# Patient Record
Sex: FEMALE | Race: WHITE | Employment: OTHER | URBAN - METROPOLITAN AREA
[De-identification: names, ages, dates, MRNs, and addresses within clinical notes are randomized per-mention and may not be internally consistent; named-entity substitution may affect disease eponyms.]

---

## 2017-07-27 ENCOUNTER — OFFICE VISIT (OUTPATIENT)
Dept: INTERNAL MEDICINE CLINIC | Age: 57
End: 2017-07-27

## 2017-07-27 VITALS
WEIGHT: 134 LBS | SYSTOLIC BLOOD PRESSURE: 112 MMHG | HEART RATE: 60 BPM | BODY MASS INDEX: 21.53 KG/M2 | HEIGHT: 66 IN | DIASTOLIC BLOOD PRESSURE: 68 MMHG

## 2017-07-27 DIAGNOSIS — D22.9 ATYPICAL MOLE: Primary | ICD-10-CM

## 2017-07-27 PROCEDURE — G8427 DOCREV CUR MEDS BY ELIG CLIN: HCPCS | Performed by: INTERNAL MEDICINE

## 2017-07-27 PROCEDURE — 3014F SCREEN MAMMO DOC REV: CPT | Performed by: INTERNAL MEDICINE

## 2017-07-27 PROCEDURE — G8420 CALC BMI NORM PARAMETERS: HCPCS | Performed by: INTERNAL MEDICINE

## 2017-07-27 PROCEDURE — 99213 OFFICE O/P EST LOW 20 MIN: CPT | Performed by: INTERNAL MEDICINE

## 2017-07-27 PROCEDURE — 3017F COLORECTAL CA SCREEN DOC REV: CPT | Performed by: INTERNAL MEDICINE

## 2017-07-27 PROCEDURE — 4004F PT TOBACCO SCREEN RCVD TLK: CPT | Performed by: INTERNAL MEDICINE

## 2017-07-27 RX ORDER — DEXTROAMPHETAMINE SACCHARATE, AMPHETAMINE ASPARTATE MONOHYDRATE, DEXTROAMPHETAMINE SULFATE AND AMPHETAMINE SULFATE 2.5; 2.5; 2.5; 2.5 MG/1; MG/1; MG/1; MG/1
10 CAPSULE, EXTENDED RELEASE ORAL EVERY MORNING
COMMUNITY
End: 2018-01-04

## 2018-01-04 ENCOUNTER — HOSPITAL ENCOUNTER (OUTPATIENT)
Dept: OTHER | Age: 58
Discharge: HOME | End: 2018-01-04
Attending: INTERNAL MEDICINE | Admitting: INTERNAL MEDICINE

## 2018-01-04 ENCOUNTER — OFFICE VISIT (OUTPATIENT)
Dept: INTERNAL MEDICINE CLINIC | Age: 58
End: 2018-01-04

## 2018-01-04 VITALS
HEIGHT: 66 IN | DIASTOLIC BLOOD PRESSURE: 62 MMHG | BODY MASS INDEX: 22.5 KG/M2 | HEART RATE: 80 BPM | SYSTOLIC BLOOD PRESSURE: 118 MMHG | TEMPERATURE: 98.9 F | WEIGHT: 140 LBS

## 2018-01-04 DIAGNOSIS — J06.9 VIRAL UPPER RESPIRATORY TRACT INFECTION: ICD-10-CM

## 2018-01-04 DIAGNOSIS — J02.9 SORE THROAT: Primary | ICD-10-CM

## 2018-01-04 LAB
RAPID INFLUENZA  B AGN: NEGATIVE
RAPID INFLUENZA A AGN: POSITIVE
S PYO AG THROAT QL: NORMAL

## 2018-01-04 PROCEDURE — 87880 STREP A ASSAY W/OPTIC: CPT | Performed by: INTERNAL MEDICINE

## 2018-01-04 PROCEDURE — G8484 FLU IMMUNIZE NO ADMIN: HCPCS | Performed by: INTERNAL MEDICINE

## 2018-01-04 PROCEDURE — 1036F TOBACCO NON-USER: CPT | Performed by: INTERNAL MEDICINE

## 2018-01-04 PROCEDURE — 99213 OFFICE O/P EST LOW 20 MIN: CPT | Performed by: INTERNAL MEDICINE

## 2018-01-04 PROCEDURE — 3014F SCREEN MAMMO DOC REV: CPT | Performed by: INTERNAL MEDICINE

## 2018-01-04 PROCEDURE — G8420 CALC BMI NORM PARAMETERS: HCPCS | Performed by: INTERNAL MEDICINE

## 2018-01-04 PROCEDURE — G8427 DOCREV CUR MEDS BY ELIG CLIN: HCPCS | Performed by: INTERNAL MEDICINE

## 2018-01-04 PROCEDURE — 3017F COLORECTAL CA SCREEN DOC REV: CPT | Performed by: INTERNAL MEDICINE

## 2018-01-04 RX ORDER — OSELTAMIVIR PHOSPHATE 75 MG/1
75 CAPSULE ORAL 2 TIMES DAILY
Qty: 10 CAPSULE | Refills: 0 | Status: SHIPPED | OUTPATIENT
Start: 2018-01-04 | End: 2018-01-09

## 2018-01-04 RX ORDER — DOXYCYCLINE HYCLATE 100 MG/1
100 CAPSULE ORAL 2 TIMES DAILY
Qty: 14 CAPSULE | Refills: 0 | Status: SHIPPED | OUTPATIENT
Start: 2018-01-04 | End: 2018-01-11

## 2018-01-04 NOTE — PROGRESS NOTES
Subjective:      Patient ID: Nikki Thacker is a 59 y.o. female. HPI-c/o fever and sore throat, wants a Strep test done  Sudden onset yest when I woke up    Review of Systems  Current Outpatient Prescriptions on File Prior to Visit   Medication Sig Dispense Refill    amphetamine-dextroamphetamine (ADDERALL XR) 10 MG extended release capsule Take 10 mg by mouth every morning .  FLUoxetine (PROZAC) 10 MG tablet Take 10 mg by mouth daily      donepezil (ARICEPT) 5 MG tablet Take 5 mg by mouth nightly      UNABLE TO FIND Take 2 tablets by mouth daily. Indications: Drenamin      Multiple Vitamins-Minerals (THERAPEUTIC MULTIVITAMIN-MINERALS) tablet Take 1 tablet by mouth daily.  ascorbic acid (VITAMIN C) 500 MG tablet Take 500 mg by mouth daily.  Coenzyme Q10 (CO Q 10) 100 MG CAPS Take 1 tablet by mouth daily. No current facility-administered medications on file prior to visit. Objective:   Physical Exam   Constitutional: She is oriented to person, place, and time. She appears well-developed and well-nourished. 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 .. HENT:   Mouth/Throat: No oropharyngeal exudate. Neck: No JVD present. No thyromegaly present. Cardiovascular: Normal rate, regular rhythm and normal heart sounds. Pulmonary/Chest: Effort normal and breath sounds normal.   Genitourinary: No vaginal discharge found. Musculoskeletal: Normal range of motion. She exhibits no edema or tenderness. Neurological: She is alert and oriented to person, place, and time. She has normal reflexes. Psychiatric: She has a normal mood and affect. Her behavior is normal.   Nursing note and vitals reviewed.     Rapid Strep screen NEGATIVE    Assessment:      URI  Patient Active Problem List   Diagnosis    IBS (irritable

## 2018-01-05 ENCOUNTER — TELEPHONE (OUTPATIENT)
Dept: INTERNAL MEDICINE CLINIC | Age: 58
End: 2018-01-05

## 2018-01-05 NOTE — TELEPHONE ENCOUNTER
----- Message from Edouard Payne MD sent at 1/5/2018  8:35 AM EST -----  Confirms flu as etiology of symptoms    Get Tamiflu as prescribed and no need to fill Doxycycline prescription    Edouard Payne

## 2018-02-28 ENCOUNTER — OFFICE VISIT (OUTPATIENT)
Dept: INTERNAL MEDICINE CLINIC | Age: 58
End: 2018-02-28

## 2018-02-28 VITALS
HEART RATE: 68 BPM | BODY MASS INDEX: 21.86 KG/M2 | TEMPERATURE: 99 F | SYSTOLIC BLOOD PRESSURE: 102 MMHG | HEIGHT: 66 IN | DIASTOLIC BLOOD PRESSURE: 58 MMHG | WEIGHT: 136 LBS

## 2018-02-28 DIAGNOSIS — J02.9 SORE THROAT: Primary | ICD-10-CM

## 2018-02-28 LAB — S PYO AG THROAT QL: NORMAL

## 2018-02-28 PROCEDURE — 87880 STREP A ASSAY W/OPTIC: CPT | Performed by: INTERNAL MEDICINE

## 2018-02-28 PROCEDURE — G8427 DOCREV CUR MEDS BY ELIG CLIN: HCPCS | Performed by: INTERNAL MEDICINE

## 2018-02-28 PROCEDURE — G8484 FLU IMMUNIZE NO ADMIN: HCPCS | Performed by: INTERNAL MEDICINE

## 2018-02-28 PROCEDURE — 3014F SCREEN MAMMO DOC REV: CPT | Performed by: INTERNAL MEDICINE

## 2018-02-28 PROCEDURE — 1036F TOBACCO NON-USER: CPT | Performed by: INTERNAL MEDICINE

## 2018-02-28 PROCEDURE — 99213 OFFICE O/P EST LOW 20 MIN: CPT | Performed by: INTERNAL MEDICINE

## 2018-02-28 PROCEDURE — 3017F COLORECTAL CA SCREEN DOC REV: CPT | Performed by: INTERNAL MEDICINE

## 2018-02-28 PROCEDURE — G8420 CALC BMI NORM PARAMETERS: HCPCS | Performed by: INTERNAL MEDICINE

## 2018-02-28 RX ORDER — OSELTAMIVIR PHOSPHATE 75 MG/1
75 CAPSULE ORAL 2 TIMES DAILY
Qty: 10 CAPSULE | Refills: 0 | Status: SHIPPED | OUTPATIENT
Start: 2018-02-28 | End: 2018-03-05

## 2018-02-28 RX ORDER — MULTIVIT-MIN/IRON/FOLIC ACID/K 18-600-40
1 CAPSULE ORAL DAILY
COMMUNITY

## 2018-03-05 ENCOUNTER — OFFICE VISIT (OUTPATIENT)
Dept: INTERNAL MEDICINE CLINIC | Age: 58
End: 2018-03-05

## 2018-03-05 VITALS
HEART RATE: 60 BPM | HEIGHT: 66 IN | BODY MASS INDEX: 21.86 KG/M2 | SYSTOLIC BLOOD PRESSURE: 102 MMHG | WEIGHT: 136 LBS | DIASTOLIC BLOOD PRESSURE: 62 MMHG

## 2018-03-05 DIAGNOSIS — J40 BRONCHITIS: ICD-10-CM

## 2018-03-05 DIAGNOSIS — B00.1 HERPES LABIALIS WITHOUT COMPLICATION: Primary | ICD-10-CM

## 2018-03-05 DIAGNOSIS — H10.32 ACUTE BACTERIAL CONJUNCTIVITIS OF LEFT EYE: ICD-10-CM

## 2018-03-05 PROCEDURE — G8427 DOCREV CUR MEDS BY ELIG CLIN: HCPCS | Performed by: INTERNAL MEDICINE

## 2018-03-05 PROCEDURE — 3017F COLORECTAL CA SCREEN DOC REV: CPT | Performed by: INTERNAL MEDICINE

## 2018-03-05 PROCEDURE — G8484 FLU IMMUNIZE NO ADMIN: HCPCS | Performed by: INTERNAL MEDICINE

## 2018-03-05 PROCEDURE — G8420 CALC BMI NORM PARAMETERS: HCPCS | Performed by: INTERNAL MEDICINE

## 2018-03-05 PROCEDURE — 1036F TOBACCO NON-USER: CPT | Performed by: INTERNAL MEDICINE

## 2018-03-05 PROCEDURE — 99213 OFFICE O/P EST LOW 20 MIN: CPT | Performed by: INTERNAL MEDICINE

## 2018-03-05 PROCEDURE — 3014F SCREEN MAMMO DOC REV: CPT | Performed by: INTERNAL MEDICINE

## 2018-03-05 RX ORDER — ACYCLOVIR 50 MG/G
OINTMENT TOPICAL
Qty: 1 TUBE | Refills: 1 | Status: CANCELLED | OUTPATIENT
Start: 2018-03-05 | End: 2018-03-12

## 2018-03-05 RX ORDER — SULFAMETHOXAZOLE AND TRIMETHOPRIM 800; 160 MG/1; MG/1
1 TABLET ORAL 2 TIMES DAILY
Qty: 20 TABLET | Refills: 0 | Status: SHIPPED | OUTPATIENT
Start: 2018-03-05 | End: 2018-03-15

## 2018-03-05 RX ORDER — SULFACETAMIDE SODIUM 100 MG/ML
2 SOLUTION/ DROPS OPHTHALMIC 4 TIMES DAILY
Qty: 1 BOTTLE | Refills: 0 | Status: SHIPPED | OUTPATIENT
Start: 2018-03-05 | End: 2018-03-15

## 2018-07-16 ENCOUNTER — TELEPHONE (OUTPATIENT)
Dept: INTERNAL MEDICINE CLINIC | Age: 58
End: 2018-07-16

## 2018-07-16 DIAGNOSIS — F22 PARANOID DELUSION (HCC): Primary | ICD-10-CM

## 2018-07-16 DIAGNOSIS — F09 CHRONIC BRAIN SYNDROME: ICD-10-CM

## 2018-07-16 NOTE — TELEPHONE ENCOUNTER
Pt coming in on Mon, July 23 for CPX, would like lab work orders. Please enter into [employer] so she can go to hospital to draw blood for tests.     ANGELICA

## 2018-07-28 LAB
A/G RATIO: 1.8 (CALC) (ref 1–2.5)
ALBUMIN SERPL-MCNC: 4.1 G/DL (ref 3.6–5.1)
ALP BLD-CCNC: 61 U/L (ref 33–130)
ALT SERPL-CCNC: 16 U/L (ref 6–29)
AST SERPL-CCNC: 19 U/L (ref 10–35)
ATYPICAL LYMPHOCYTE RELATIVE PERCENT: ABNORMAL % (ref 0–10)
BAND NEUTROPHILS: ABNORMAL %
BANDED NEUTROPHILS ABSOLUTE COUNT: ABNORMAL CELLS/UL (ref 0–750)
BASOPHILS ABSOLUTE: 20 CELLS/UL (ref 0–200)
BASOPHILS RELATIVE PERCENT: 0.6 %
BILIRUB SERPL-MCNC: 0.7 MG/DL (ref 0.2–1.2)
BLASTS ABSOLUTE COUNT: ABNORMAL CELLS/UL
BLASTS RELATIVE PERCENT: ABNORMAL %
BUN / CREAT RATIO: NORMAL (CALC) (ref 6–22)
BUN BLDV-MCNC: 10 MG/DL (ref 7–25)
CALCIUM SERPL-MCNC: 9.1 MG/DL (ref 8.6–10.4)
CHLORIDE BLD-SCNC: 102 MMOL/L (ref 98–110)
CHOLESTEROL, TOTAL: 180 MG/DL
CHOLESTEROL/HDL RATIO: 2.8 (CALC)
CHOLESTEROL: 116 MG/DL (CALC)
CO2: 28 MMOL/L (ref 20–31)
COMMENT: ABNORMAL
CREAT SERPL-MCNC: 0.67 MG/DL (ref 0.5–1.05)
EOSINOPHILS ABSOLUTE: 102 CELLS/UL (ref 15–500)
EOSINOPHILS RELATIVE PERCENT: 3 %
GFR AFRICAN AMERICAN: 112 ML/MIN/1.73M2
GFR SERPL CREATININE-BSD FRML MDRD: 96 ML/MIN/1.73M2
GLOBULIN: 2.3 G/DL (CALC) (ref 1.9–3.7)
GLUCOSE BLD-MCNC: 80 MG/DL (ref 65–99)
HCT VFR BLD CALC: 41 % (ref 35–45)
HDLC SERPL-MCNC: 64 MG/DL
HEMOGLOBIN: 13.7 G/DL (ref 11.7–15.5)
LDL CHOLESTEROL CALCULATED: 100 MG/DL (CALC)
LYMPHOCYTES ABSOLUTE: 1384 CELLS/UL (ref 850–3900)
LYMPHOCYTES RELATIVE PERCENT: 40.7 %
MCH RBC QN AUTO: 29.8 PG (ref 27–33)
MCHC RBC AUTO-ENTMCNC: 33.4 G/DL (ref 32–36)
MCV RBC AUTO: 89.1 FL (ref 80–100)
METAMYELOCYTES ABSOLUTE COUNT: ABNORMAL CELLS/UL
METAMYELOCYTES RELATIVE PERCENT: ABNORMAL %
MONOCYTES ABSOLUTE: 394 CELLS/UL (ref 200–950)
MONOCYTES RELATIVE PERCENT: 11.6 %
MYELOCYTE PERCENT: ABNORMAL %
MYELOCYTES ABSOLUTE COUNT: ABNORMAL CELLS/UL
NEUTROPHILS ABSOLUTE: 1499 CELLS/UL (ref 1500–7800)
NUCLEATED RED BLOOD CELLS: ABNORMAL /100 WBC
NUCLEATED RED BLOOD CELLS: ABNORMAL CELLS/UL
PDW BLD-RTO: 12.7 % (ref 11–15)
PLATELET # BLD: 232 THOUSAND/UL (ref 140–400)
PMV BLD AUTO: 9.6 FL (ref 7.5–12.5)
POTASSIUM SERPL-SCNC: 4.4 MMOL/L (ref 3.5–5.3)
PROMYELOCYTES ABSOLUTE COUNT: ABNORMAL CELLS/UL
PROMYELOCYTES PERCENT: ABNORMAL %
RBC # BLD: 4.6 MILLION/UL (ref 3.8–5.1)
SEGMENTED NEUTROPHILS RELATIVE PERCENT: 44.1 %
SODIUM BLD-SCNC: 137 MMOL/L (ref 135–146)
TOTAL PROTEIN: 6.4 G/DL (ref 6.1–8.1)
TRIGL SERPL-MCNC: 75 MG/DL
WBC # BLD: 3.4 THOUSAND/UL (ref 3.8–10.8)

## 2018-08-06 ENCOUNTER — TELEPHONE (OUTPATIENT)
Dept: INTERNAL MEDICINE CLINIC | Age: 58
End: 2018-08-06

## 2018-08-20 ENCOUNTER — OFFICE VISIT (OUTPATIENT)
Dept: INTERNAL MEDICINE CLINIC | Age: 58
End: 2018-08-20

## 2018-08-20 VITALS
HEIGHT: 66 IN | HEART RATE: 60 BPM | DIASTOLIC BLOOD PRESSURE: 64 MMHG | WEIGHT: 138 LBS | BODY MASS INDEX: 22.18 KG/M2 | SYSTOLIC BLOOD PRESSURE: 110 MMHG

## 2018-08-20 DIAGNOSIS — Z12.39 BREAST CANCER SCREENING: ICD-10-CM

## 2018-08-20 DIAGNOSIS — Z00.00 ANNUAL PHYSICAL EXAM: Primary | ICD-10-CM

## 2018-08-20 DIAGNOSIS — Z12.11 COLON CANCER SCREENING: ICD-10-CM

## 2018-08-20 DIAGNOSIS — Z01.419 ENCOUNTER FOR GYNECOLOGICAL EXAMINATION WITHOUT ABNORMAL FINDING: ICD-10-CM

## 2018-08-20 LAB
BILIRUBIN, POC: NORMAL
BLOOD URINE, POC: NORMAL
CLARITY, POC: CLEAR
COLOR, POC: NORMAL
GLUCOSE URINE, POC: NORMAL
KETONES, POC: NORMAL
LEUKOCYTE EST, POC: NORMAL
NITRITE, POC: NORMAL
PH, POC: 5
PROTEIN, POC: NORMAL
SPECIFIC GRAVITY, POC: 1.01
UROBILINOGEN, POC: 1

## 2018-08-20 PROCEDURE — 3017F COLORECTAL CA SCREEN DOC REV: CPT | Performed by: INTERNAL MEDICINE

## 2018-08-20 PROCEDURE — G8420 CALC BMI NORM PARAMETERS: HCPCS | Performed by: INTERNAL MEDICINE

## 2018-08-20 PROCEDURE — 99396 PREV VISIT EST AGE 40-64: CPT | Performed by: INTERNAL MEDICINE

## 2018-08-20 PROCEDURE — 1036F TOBACCO NON-USER: CPT | Performed by: INTERNAL MEDICINE

## 2018-08-20 PROCEDURE — G8427 DOCREV CUR MEDS BY ELIG CLIN: HCPCS | Performed by: INTERNAL MEDICINE

## 2018-08-20 PROCEDURE — 93000 ELECTROCARDIOGRAM COMPLETE: CPT | Performed by: INTERNAL MEDICINE

## 2018-08-20 PROCEDURE — 81002 URINALYSIS NONAUTO W/O SCOPE: CPT | Performed by: INTERNAL MEDICINE

## 2018-08-20 RX ORDER — MAGNESIUM OXIDE 400 MG/1
200 TABLET ORAL DAILY
COMMUNITY

## 2018-08-20 NOTE — PROGRESS NOTES
07/27/2018     Lab Results   Component Value Date     07/27/2018    K 4.4 07/27/2018     07/27/2018    CO2 28 07/27/2018    BUN 10 07/27/2018    CREATININE 0.67 07/27/2018    GLUCOSE 80 07/27/2018    CALCIUM 9.1 07/27/2018    PROT 6.4 07/27/2018    LABALBU 4.1 07/27/2018    BILITOT 0.7 07/27/2018    ALKPHOS 61 07/27/2018    AST 19 07/27/2018    ALT 16 07/27/2018    LABGLOM 96 07/27/2018    GFRAA 112 07/27/2018    AGRATIO 1.8 07/27/2018    GLOB 2.3 07/27/2018       Lab Results   Component Value Date    CHOL 180 07/27/2018    CHOL 116 07/27/2018    CHOL 191 11/08/2014     Lab Results   Component Value Date    TRIG 75 07/27/2018    TRIG 67 11/08/2014     Lab Results   Component Value Date    HDL 64 07/27/2018    HDL 55 11/08/2014     Lab Results   Component Value Date    LDLCALC 100 (H) 07/27/2018    LDLCALC 123 11/08/2014     No results found for: LABVLDL, VLDL  Lab Results   Component Value Date    CHOLHDLRATIO 2.8 07/27/2018    CHOLHDLRATIO 3.5 11/08/2014       Assessment:      Patient Active Problem List   Diagnosis    IBS (irritable bowel syndrome)    Depression    Anxiety    Colon cancer screening    Chronic brain syndrome    Breast cancer screening    Paranoid delusion ([facility] [address]           Plan:      Advised pt to continue f/u with her psychiatrist regarding her multiple issues    Reassured pt that medically she is stable  Screening mammogram ordered    Advised f/u with GYN for PAP-referred    GI referral for another screening colonoscopy since no documentation of original exam has been identified  Used to see Gris Harris    No Echo needed due to past MVP, was told she was fine for scuba diving    Surendra Galeano     HM: annual fluvax is advised  Consider Maria R Galeano MD

## 2018-12-10 ENCOUNTER — HOSPITAL ENCOUNTER (OUTPATIENT)
Age: 58
Discharge: HOME | End: 2018-12-10
Payer: COMMERCIAL

## 2018-12-10 ENCOUNTER — OFFICE VISIT (OUTPATIENT)
Dept: INTERNAL MEDICINE CLINIC | Age: 58
End: 2018-12-10

## 2018-12-10 VITALS
HEIGHT: 66 IN | SYSTOLIC BLOOD PRESSURE: 110 MMHG | DIASTOLIC BLOOD PRESSURE: 62 MMHG | HEART RATE: 68 BPM | BODY MASS INDEX: 22.82 KG/M2 | WEIGHT: 142 LBS | TEMPERATURE: 98.6 F

## 2018-12-10 DIAGNOSIS — J02.9 SORE THROAT: Primary | ICD-10-CM

## 2018-12-10 LAB
RAPID INFLUENZA  B AGN: NEGATIVE
RAPID INFLUENZA A AGN: NEGATIVE
S PYO AG THROAT QL: NORMAL

## 2018-12-10 PROCEDURE — G8427 DOCREV CUR MEDS BY ELIG CLIN: HCPCS | Performed by: INTERNAL MEDICINE

## 2018-12-10 PROCEDURE — G8420 CALC BMI NORM PARAMETERS: HCPCS | Performed by: INTERNAL MEDICINE

## 2018-12-10 PROCEDURE — 3017F COLORECTAL CA SCREEN DOC REV: CPT | Performed by: INTERNAL MEDICINE

## 2018-12-10 PROCEDURE — 99213 OFFICE O/P EST LOW 20 MIN: CPT | Performed by: INTERNAL MEDICINE

## 2018-12-10 PROCEDURE — 87880 STREP A ASSAY W/OPTIC: CPT | Performed by: INTERNAL MEDICINE

## 2018-12-10 PROCEDURE — 87804 INFLUENZA ASSAY W/OPTIC: CPT

## 2018-12-10 PROCEDURE — G8484 FLU IMMUNIZE NO ADMIN: HCPCS | Performed by: INTERNAL MEDICINE

## 2018-12-10 PROCEDURE — 1036F TOBACCO NON-USER: CPT | Performed by: INTERNAL MEDICINE

## 2018-12-10 RX ORDER — VITAMIN E 268 MG
400 CAPSULE ORAL DAILY
COMMUNITY

## 2018-12-10 RX ORDER — FLUOXETINE HYDROCHLORIDE 20 MG/1
40 CAPSULE ORAL DAILY
COMMUNITY
End: 2022-07-05

## 2018-12-11 ENCOUNTER — TELEPHONE (OUTPATIENT)
Dept: INTERNAL MEDICINE CLINIC | Age: 58
End: 2018-12-11

## 2019-10-02 ENCOUNTER — TELEPHONE (OUTPATIENT)
Dept: PRIMARY CARE CLINIC | Age: 59
End: 2019-10-02

## 2019-10-02 DIAGNOSIS — Z00.00 WELL ADULT HEALTH CHECK: Primary | ICD-10-CM

## 2022-06-16 ENCOUNTER — TELEPHONE (OUTPATIENT)
Dept: PRIMARY CARE CLINIC | Age: 62
End: 2022-06-16

## 2022-07-05 ENCOUNTER — OFFICE VISIT (OUTPATIENT)
Dept: PRIMARY CARE CLINIC | Age: 62
End: 2022-07-05
Payer: COMMERCIAL

## 2022-07-05 VITALS
WEIGHT: 131.4 LBS | OXYGEN SATURATION: 98 % | DIASTOLIC BLOOD PRESSURE: 64 MMHG | SYSTOLIC BLOOD PRESSURE: 106 MMHG | HEIGHT: 66 IN | BODY MASS INDEX: 21.12 KG/M2 | HEART RATE: 67 BPM

## 2022-07-05 DIAGNOSIS — Z82.62 FAM HX-OSTEOPOROSIS: ICD-10-CM

## 2022-07-05 DIAGNOSIS — M72.2 PLANTAR FASCIAL FIBROMATOSIS OF LEFT FOOT: ICD-10-CM

## 2022-07-05 DIAGNOSIS — F41.9 ANXIETY: ICD-10-CM

## 2022-07-05 DIAGNOSIS — Z76.89 ENCOUNTER TO ESTABLISH CARE WITH NEW DOCTOR: Primary | ICD-10-CM

## 2022-07-05 DIAGNOSIS — F32.1 CURRENT MODERATE EPISODE OF MAJOR DEPRESSIVE DISORDER, UNSPECIFIED WHETHER RECURRENT (HCC): ICD-10-CM

## 2022-07-05 DIAGNOSIS — Z00.00 WELL ADULT EXAM: ICD-10-CM

## 2022-07-05 DIAGNOSIS — E83.42 HYPOMAGNESEMIA: ICD-10-CM

## 2022-07-05 DIAGNOSIS — Z01.419 ENCOUNTER FOR GYNECOLOGICAL EXAMINATION WITHOUT ABNORMAL FINDING: ICD-10-CM

## 2022-07-05 DIAGNOSIS — Z12.11 COLON CANCER SCREENING: ICD-10-CM

## 2022-07-05 DIAGNOSIS — Z11.59 SCREENING FOR VIRAL DISEASE: ICD-10-CM

## 2022-07-05 PROCEDURE — 99205 OFFICE O/P NEW HI 60 MIN: CPT | Performed by: INTERNAL MEDICINE

## 2022-07-05 PROCEDURE — 3017F COLORECTAL CA SCREEN DOC REV: CPT | Performed by: INTERNAL MEDICINE

## 2022-07-05 PROCEDURE — G8420 CALC BMI NORM PARAMETERS: HCPCS | Performed by: INTERNAL MEDICINE

## 2022-07-05 PROCEDURE — G8427 DOCREV CUR MEDS BY ELIG CLIN: HCPCS | Performed by: INTERNAL MEDICINE

## 2022-07-05 PROCEDURE — 1036F TOBACCO NON-USER: CPT | Performed by: INTERNAL MEDICINE

## 2022-07-05 RX ORDER — PNV NO.95/FERROUS FUM/FOLIC AC 28MG-0.8MG
2 TABLET ORAL DAILY
COMMUNITY

## 2022-07-05 ASSESSMENT — PATIENT HEALTH QUESTIONNAIRE - PHQ9
6. FEELING BAD ABOUT YOURSELF - OR THAT YOU ARE A FAILURE OR HAVE LET YOURSELF OR YOUR FAMILY DOWN: 0
SUM OF ALL RESPONSES TO PHQ QUESTIONS 1-9: 0
3. TROUBLE FALLING OR STAYING ASLEEP: 0
8. MOVING OR SPEAKING SO SLOWLY THAT OTHER PEOPLE COULD HAVE NOTICED. OR THE OPPOSITE, BEING SO FIGETY OR RESTLESS THAT YOU HAVE BEEN MOVING AROUND A LOT MORE THAN USUAL: 0
1. LITTLE INTEREST OR PLEASURE IN DOING THINGS: 0
SUM OF ALL RESPONSES TO PHQ QUESTIONS 1-9: 0
7. TROUBLE CONCENTRATING ON THINGS, SUCH AS READING THE NEWSPAPER OR WATCHING TELEVISION: 0
9. THOUGHTS THAT YOU WOULD BE BETTER OFF DEAD, OR OF HURTING YOURSELF: 0
2. FEELING DOWN, DEPRESSED OR HOPELESS: 0
SUM OF ALL RESPONSES TO PHQ QUESTIONS 1-9: 0
4. FEELING TIRED OR HAVING LITTLE ENERGY: 0
5. POOR APPETITE OR OVEREATING: 0
SUM OF ALL RESPONSES TO PHQ QUESTIONS 1-9: 0
10. IF YOU CHECKED OFF ANY PROBLEMS, HOW DIFFICULT HAVE THESE PROBLEMS MADE IT FOR YOU TO DO YOUR WORK, TAKE CARE OF THINGS AT HOME, OR GET ALONG WITH OTHER PEOPLE: 0
SUM OF ALL RESPONSES TO PHQ9 QUESTIONS 1 & 2: 0

## 2022-07-05 NOTE — PATIENT INSTRUCTIONS
Consider Shingrix vaccination series of 2 shots over 2-6 months if desired for protection from shingles-check with INS first re: coverage before beginning series    Schedule screening colonoscopy for colon CA screening    Check fasting labs    F/u with GYN for PAP smears as indicated

## 2022-07-05 NOTE — PROGRESS NOTES
SUBJECTIVE:  7/5/2022   Katie Barba  1960    Past Medical History:   Diagnosis Date    Anxiety     Depression      No past surgical history on file. Family History   Problem Relation Age of Onset    Hearing Loss Mother     High Blood Pressure Mother     High Cholesterol Mother     Arthritis Father     Depression Father     High Cholesterol Father       Social History     Socioeconomic History    Marital status: Single     Spouse name: Not on file    Number of children: 0    Years of education: Not on file    Highest education level: Not on file   Occupational History    Occupation: teacher     Comment: refuses to tell   Tobacco Use    Smoking status: Never Smoker    Smokeless tobacco: Never Used    Tobacco comment: never   Substance and Sexual Activity    Alcohol use: No     Comment: none    Drug use: No    Sexual activity: Not Currently     Partners: Male   Other Topics Concern    Not on file   Social History Narrative    Not on file     Social Determinants of Health     Financial Resource Strain:     Difficulty of Paying Living Expenses: Not on file   Food Insecurity:     Worried About Running Out of Food in the Last Year: Not on file    Doroteo of Food in the Last Year: Not on file   Transportation Needs:     Lack of Transportation (Medical): Not on file    Lack of Transportation (Non-Medical):  Not on file   Physical Activity:     Days of Exercise per Week: Not on file    Minutes of Exercise per Session: Not on file   Stress:     Feeling of Stress : Not on file   Social Connections:     Frequency of Communication with Friends and Family: Not on file    Frequency of Social Gatherings with Friends and Family: Not on file    Attends Anabaptism Services: Not on file    Active Member of Clubs or Organizations: Not on file    Attends Club or Organization Meetings: Not on file    Marital Status: Not on file   Intimate Partner Violence:     Fear of Current or Ex-Partner: Not on file    Emotionally Abused: Not on file    Physically Abused: Not on file    Sexually Abused: Not on file   Housing Stability:     Unable to Pay for Housing in the Last Year: Not on file    Number of Places Lived in the Last Year: Not on file    Unstable Housing in the Last Year: Not on file      Allergies   Allergen Reactions    Amoxicillin Rash      Current Outpatient Medications   Medication Sig Dispense Refill    Omega-3 Fatty Acids (FISH OIL OMEGA-3) 1000 MG CAPS Take 2 g by mouth daily      vitamin E 400 UNIT capsule Take 400 Units by mouth daily      aspirin 81 MG tablet Take 81 mg by mouth daily      magnesium oxide (MAG-OX) 400 MG tablet Take 200 mg by mouth daily      Cholecalciferol (VITAMIN D) 2000 units CAPS capsule Take 1 capsule by mouth daily      Multiple Vitamins-Minerals (THERAPEUTIC MULTIVITAMIN-MINERALS) tablet Take 1 tablet by mouth daily.  ascorbic acid (VITAMIN C) 500 MG tablet Take 500 mg by mouth daily.  Coenzyme Q10 (CO Q 10) 100 MG CAPS Take 1 tablet by mouth daily. No current facility-administered medications for this visit. Chief Complaint   Patient presents with    New Patient       HPI: wants to re-establish care with this provider, hasn't been seen in 4 years  Needs labs done.  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 Constitutional:       General: She is not in acute distress. Appearance: Normal appearance. She is well-developed. Comments: Anxious and suspicious demeanor   HENT:      Head: Normocephalic and atraumatic. Right Ear: Tympanic membrane, ear canal and external ear normal.      Left Ear: Tympanic membrane, ear canal and external ear normal.      Nose: Nose normal. No rhinorrhea. Mouth/Throat:      Pharynx: No oropharyngeal exudate or posterior oropharyngeal erythema. Eyes:      General:         Right eye: No discharge. Left eye: No discharge. Extraocular Movements: Extraocular movements intact. Conjunctiva/sclera: Conjunctivae normal.      Pupils: Pupils are equal, round, and reactive to light. Neck:      Thyroid: No thyromegaly. Vascular: No carotid bruit or JVD. Cardiovascular:      Rate and Rhythm: Normal rate and regular rhythm. Pulses: Normal pulses. Heart sounds: Normal heart sounds. No murmur heard. Pulmonary:      Effort: Pulmonary effort is normal. No respiratory distress. Breath sounds: Normal breath sounds. No wheezing, rhonchi or rales. Abdominal:      General: Bowel sounds are normal. There is no distension. Palpations: Abdomen is soft. Tenderness: There is no abdominal tenderness. There is no rebound. Musculoskeletal:         General: No swelling. Cervical back: Normal range of motion. No muscular tenderness. Right lower leg: No edema. Left lower leg: No edema. Comments: FROM x 4   Lymphadenopathy:      Cervical: No cervical adenopathy. Skin:     General: Skin is warm and dry. Capillary Refill: Capillary refill takes 2 to 3 seconds. Coloration: Skin is not pale. Findings: No erythema or rash.    Neurological: Mental Status: She is alert and oriented to person, place, and time. Cranial Nerves: No cranial nerve deficit. Sensory: No sensory deficit. Motor: No abnormal muscle tone. Deep Tendon Reflexes: Reflexes normal.   Psychiatric:         Mood and Affect: Mood normal.         Behavior: Behavior normal.         Thought Content: Thought content normal.         Judgment: Judgment normal.         Data Review:  Lab Results   Component Value Date    CHOL 180 07/27/2018    CHOL 116 07/27/2018    CHOL 191 11/08/2014     Lab Results   Component Value Date    TRIG 75 07/27/2018    TRIG 67 11/08/2014     Lab Results   Component Value Date    HDL 64 07/27/2018    HDL 55 11/08/2014     Lab Results   Component Value Date    LDLCALC 100 (H) 07/27/2018    LDLCALC 123 11/08/2014     No results found for: LABVLDL, VLDL  Lab Results   Component Value Date    CHOLHDLRATIO 2.8 07/27/2018    CHOLHDLRATIO 3.5 11/08/2014      Lab Results   Component Value Date    WBC 3.4 (L) 07/27/2018    HGB 13.7 07/27/2018    HCT 41.0 07/27/2018    MCV 89.1 07/27/2018     07/27/2018     Lab Results   Component Value Date     07/27/2018    K 4.4 07/27/2018     07/27/2018    CO2 28 07/27/2018    BUN 10 07/27/2018    CREATININE 0.67 07/27/2018    GLUCOSE 80 07/27/2018    CALCIUM 9.1 07/27/2018    PROT 6.4 07/27/2018    LABALBU 4.1 07/27/2018    BILITOT 0.7 07/27/2018    ALKPHOS 61 07/27/2018    AST 19 07/27/2018    ALT 16 07/27/2018    LABGLOM 96 07/27/2018    GFRAA 112 07/27/2018    AGRATIO 1.8 07/27/2018    GLOB 2.3 07/27/2018       ASSESSMENT/PLAN  1. Anxiety  Psych referral already sees Karis    2.  Current moderate episode of major depressive disorder, unspecified whether recurrent ([facility] [address]  Psych referral  Depression screen today    Consider Shingrix vaccination series of 2 shots over 2-6 months if desired for protection from shingles-check with INS first re: coverage before beginning series  Schedule colonoscopy with GI for colon CA screening    Fasting labs ordered     F/u with GYN for PAP smears as indicated    wants DXA next year  Wants vit D level  belches a lot, no reflux or anything, mom does the same thing, will discuss with GI re: possible EGD    Want her foot checked-she will decide if she wants foot x-ray or not     f/u in 6 months if labs Lynda Ivey MD      Electronically signed by Dyan Medina MD on 7/5/2022 at 10:13 AM

## 2022-07-05 NOTE — PROGRESS NOTES
Pt here to re-est care with Dr Damon Landry. Not seen since 12/2018    Stevie Pinon no issues or concerns today    Pt needs to complete the following. ..   Colorectal Cancer Screen  Cervical Scan/PAP  Shingrix

## 2022-07-19 ENCOUNTER — TELEPHONE (OUTPATIENT)
Dept: PRIMARY CARE CLINIC | Age: 62
End: 2022-07-19

## 2022-07-19 NOTE — TELEPHONE ENCOUNTER
Pt is calling stating she was last in July 5 is calling wanting to ask some questions pt is wanting to know why Jerzy Whitley ordered a test for  Magnesium? Pt states the diginose for major Depressive Disorder is not true it is incorrect is asking if that can be changed pt wants to know why she has to have a follow up in 6 months?  Pt is requesting MA to call her back

## 2022-07-20 NOTE — TELEPHONE ENCOUNTER
Informed pt that the Dx codes have been changed to match the visit.  Also mentioned with family Hx of osteoporosis Msg levels should be checked

## 2023-02-28 ENCOUNTER — TELEPHONE (OUTPATIENT)
Dept: PRIMARY CARE CLINIC | Age: 63
End: 2023-02-28

## 2023-03-02 ENCOUNTER — OFFICE VISIT (OUTPATIENT)
Dept: PRIMARY CARE CLINIC | Age: 63
End: 2023-03-02
Payer: COMMERCIAL

## 2023-03-02 VITALS
DIASTOLIC BLOOD PRESSURE: 68 MMHG | SYSTOLIC BLOOD PRESSURE: 122 MMHG | OXYGEN SATURATION: 98 % | HEART RATE: 91 BPM | WEIGHT: 136 LBS | BODY MASS INDEX: 22.29 KG/M2 | TEMPERATURE: 98.2 F

## 2023-03-02 DIAGNOSIS — Z12.11 COLON CANCER SCREENING: Primary | ICD-10-CM

## 2023-03-02 DIAGNOSIS — Z82.62 FHX: OSTEOPOROSIS: ICD-10-CM

## 2023-03-02 DIAGNOSIS — R53.83 OTHER FATIGUE: ICD-10-CM

## 2023-03-02 PROCEDURE — 3017F COLORECTAL CA SCREEN DOC REV: CPT | Performed by: INTERNAL MEDICINE

## 2023-03-02 PROCEDURE — G8420 CALC BMI NORM PARAMETERS: HCPCS | Performed by: INTERNAL MEDICINE

## 2023-03-02 PROCEDURE — 99213 OFFICE O/P EST LOW 20 MIN: CPT | Performed by: INTERNAL MEDICINE

## 2023-03-02 PROCEDURE — G8427 DOCREV CUR MEDS BY ELIG CLIN: HCPCS | Performed by: INTERNAL MEDICINE

## 2023-03-02 PROCEDURE — G8482 FLU IMMUNIZE ORDER/ADMIN: HCPCS | Performed by: INTERNAL MEDICINE

## 2023-03-02 PROCEDURE — 1036F TOBACCO NON-USER: CPT | Performed by: INTERNAL MEDICINE

## 2023-03-02 ASSESSMENT — PATIENT HEALTH QUESTIONNAIRE - PHQ9
1. LITTLE INTEREST OR PLEASURE IN DOING THINGS: 0
SUM OF ALL RESPONSES TO PHQ QUESTIONS 1-9: 5
2. FEELING DOWN, DEPRESSED OR HOPELESS: 0
8. MOVING OR SPEAKING SO SLOWLY THAT OTHER PEOPLE COULD HAVE NOTICED. OR THE OPPOSITE, BEING SO FIGETY OR RESTLESS THAT YOU HAVE BEEN MOVING AROUND A LOT MORE THAN USUAL: 0
SUM OF ALL RESPONSES TO PHQ QUESTIONS 1-9: 5
SUM OF ALL RESPONSES TO PHQ QUESTIONS 1-9: 5
7. TROUBLE CONCENTRATING ON THINGS, SUCH AS READING THE NEWSPAPER OR WATCHING TELEVISION: 0
3. TROUBLE FALLING OR STAYING ASLEEP: 3
5. POOR APPETITE OR OVEREATING: 0
9. THOUGHTS THAT YOU WOULD BE BETTER OFF DEAD, OR OF HURTING YOURSELF: 0
4. FEELING TIRED OR HAVING LITTLE ENERGY: 2
SUM OF ALL RESPONSES TO PHQ9 QUESTIONS 1 & 2: 0
6. FEELING BAD ABOUT YOURSELF - OR THAT YOU ARE A FAILURE OR HAVE LET YOURSELF OR YOUR FAMILY DOWN: 0
SUM OF ALL RESPONSES TO PHQ QUESTIONS 1-9: 5

## 2023-03-02 NOTE — PATIENT INSTRUCTIONS
F/u with GYN for PAP smears as indicated    Consider getting colonoscopy for colon CA screening    Consider Shingrix vaccination series of 2 shots over 2-6 months if desired for protection from shingles-check with INS first re: coverage before beginning series

## 2023-03-02 NOTE — PROGRESS NOTES
3/2/2023   Katie Barba  1960    The patients PMH, surgical history, family history, medications, allergies were all reviewed and updated as appropriate today. Current Outpatient Medications on File Prior to Visit   Medication Sig Dispense Refill    Omega-3 Fatty Acids (FISH OIL OMEGA-3) 1000 MG CAPS Take 2 g by mouth daily      vitamin E 400 UNIT capsule Take 400 Units by mouth daily      aspirin 81 MG tablet Take 81 mg by mouth daily      magnesium oxide (MAG-OX) 400 MG tablet Take 200 mg by mouth daily      Cholecalciferol (VITAMIN D) 2000 units CAPS capsule Take 1 capsule by mouth daily      Multiple Vitamins-Minerals (THERAPEUTIC MULTIVITAMIN-MINERALS) tablet Take 1 tablet by mouth daily. ascorbic acid (VITAMIN C) 500 MG tablet Take 500 mg by mouth daily. Coenzyme Q10 (CO Q 10) 100 MG CAPS Take 1 tablet by mouth daily. No current facility-administered medications on file prior to visit. Chief Complaint   Patient presents with    6 Month Follow-Up     Pt here for 6 month follow up  No Complaints     Discuss Labs         HPI: Patient here today to review her most recent labs. She did have low magnesium levels in the past and therefore takes 1 magnesium oxide tablet daily, repeat magnesium level this time was normal at 2.1. Her cholesterol values actually look fine with a total cholesterol of 179, vitamin D level was normal blood sugar was normal at 90   Wants a DXA scan due to ostopeorosis in mother    Review of Systems    OBJECTIVE:  /68   Pulse 91   Temp 98.2 °F (36.8 °C) (Infrared)   Wt 136 lb (61.7 kg)   SpO2 98%   BMI 22.29 kg/m²       Wt Readings from Last 3 Encounters:   07/05/22 131 lb 6.4 oz (59.6 kg)   12/10/18 142 lb (64.4 kg)   08/20/18 138 lb (62.6 kg)       Physical Exam  Vitals and nursing note reviewed. Constitutional:       Appearance: Normal appearance. She is well-developed and normal weight.       Comments:      Neck:      Thyroid: No thyromegaly. Vascular: No JVD. Cardiovascular:      Rate and Rhythm: Normal rate and regular rhythm. Heart sounds: Normal heart sounds. Pulmonary:      Effort: Pulmonary effort is normal.      Breath sounds: Normal breath sounds. Abdominal:      General: Abdomen is flat. Musculoskeletal:         General: No tenderness. Normal range of motion. Neurological:      Mental Status: She is alert and oriented to person, place, and time. Deep Tendon Reflexes: Reflexes are normal and symmetric.    Psychiatric:         Behavior: Behavior normal.       Data Review:   CBC:   Lab Results   Component Value Date/Time    WBC 3.4 07/27/2018 11:48 AM    WBC 5.7 08/15/2014 06:24 AM    WBC 4.0 03/29/2013 10:39 AM    HGB 13.7 07/27/2018 11:48 AM    HGB 14.2 08/15/2014 06:24 AM    HGB 14.2 03/29/2013 10:39 AM    HCT 41.0 07/27/2018 11:48 AM    HCT 41.8 08/15/2014 06:24 AM    HCT 42.0 03/29/2013 10:39 AM    MCV 89.1 07/27/2018 11:48 AM    MCV 91.4 08/15/2014 06:24 AM    MCV 91.7 03/29/2013 10:39 AM     07/27/2018 11:48 AM     08/15/2014 06:24 AM     03/29/2013 10:39 AM     Chemistry:   Lab Results   Component Value Date/Time     07/27/2018 11:48 AM     08/15/2014 06:24 AM     03/29/2013 10:39 AM    K 4.4 07/27/2018 11:48 AM    K 3.8 08/15/2014 06:24 AM    K 4.0 03/29/2013 10:39 AM     07/27/2018 11:48 AM     08/15/2014 06:24 AM     03/29/2013 10:39 AM    CO2 28 07/27/2018 11:48 AM    CO2 27 08/15/2014 06:24 AM    CO2 27 03/29/2013 10:39 AM    BUN 10 07/27/2018 11:48 AM    BUN 8 08/15/2014 06:24 AM    BUN 12 03/29/2013 10:39 AM    CREATININE 0.67 07/27/2018 11:48 AM    CREATININE 0.5 08/15/2014 06:24 AM    CREATININE 0.73 03/29/2013 10:39 AM     Hepatic Function:   Lab Results   Component Value Date/Time    AST 19 07/27/2018 11:48 AM    AST 21 08/15/2014 06:24 AM    AST 25 03/29/2013 10:39 AM    ALT 16 07/27/2018 11:48 AM    ALT 17 08/15/2014 06:24 AM    ALT 17 03/29/2013 10:39 AM    BILITOT 0.7 07/27/2018 11:48 AM    BILITOT <0.2 08/15/2014 06:24 AM    BILITOT 0.9 03/29/2013 10:39 AM    ALKPHOS 61 07/27/2018 11:48 AM    ALKPHOS 96 08/15/2014 06:24 AM    ALKPHOS 72 03/29/2013 10:39 AM     No results found for: LIPASE, AMYLASE  Lipids:   Lab Results   Component Value Date/Time    CHOL 180 07/27/2018 11:48 AM    CHOL 116 07/27/2018 11:48 AM    HDL 64 07/27/2018 11:48 AM    TRIG 75 07/27/2018 11:48 AM       ASSESSMENT/PLAN  Patient is reassured regarding her recent normal labs at [facility] (in [place])    GI referral advised for colon CA screening    Consider Shingrix vaccination series of 2 shots over 2-6 months if desired for protection from shingles-check with INS first re: coverage before beginning series    Pt advised to f/u with GYN for PAP smears as indicated    On no medication for paranoid delusions but she sees a therapist     Wants CRP run again    iGsel Ely MD    Electronically signed by Gisel Ely MD on 3/2/2023 at 9:48 AM

## 2024-01-23 ENCOUNTER — COMMUNITY OUTREACH (OUTPATIENT)
Dept: PRIMARY CARE CLINIC | Age: 64
End: 2024-01-23

## 2024-01-23 NOTE — PROGRESS NOTES
Patient's  shows they are overdue for Colonoscopy, Mammogram   CareEverywhere and  files searched  without success.

## 2024-04-17 ENCOUNTER — TELEPHONE (OUTPATIENT)
Dept: PRIMARY CARE CLINIC | Age: 64
End: 2024-04-17

## 2024-04-17 NOTE — TELEPHONE ENCOUNTER
Patient called and asked is there an importance on what vaccines she should get.  She was looking to get 4 vaccines, Covid, RSV, shingles and for pneumonia.  Patient wanted  to ask the provider which vaccine is more important due to her age.  Please advise.

## 2024-04-17 NOTE — TELEPHONE ENCOUNTER
Inform patient that vaccines can discussed when patient comes in for a visit with PCP. She is due for well visit, last seen by Dr. Mendez 03/2023

## 2024-04-18 NOTE — TELEPHONE ENCOUNTER
Spoke with the pt. Pt is asking to have this message reviewed by Dr Mendez. Pt would like his suggestions on the vaccines

## 2024-09-04 ENCOUNTER — OFFICE VISIT (OUTPATIENT)
Dept: PRIMARY CARE CLINIC | Age: 64
End: 2024-09-04
Payer: MEDICARE

## 2024-09-04 VITALS
HEIGHT: 66 IN | BODY MASS INDEX: 20.28 KG/M2 | DIASTOLIC BLOOD PRESSURE: 72 MMHG | SYSTOLIC BLOOD PRESSURE: 122 MMHG | RESPIRATION RATE: 14 BRPM | HEART RATE: 77 BPM | OXYGEN SATURATION: 98 % | WEIGHT: 126.2 LBS | TEMPERATURE: 98.4 F

## 2024-09-04 DIAGNOSIS — S99.922A INJURY OF TOE ON LEFT FOOT, INITIAL ENCOUNTER: Primary | ICD-10-CM

## 2024-09-04 DIAGNOSIS — S91.341A PUNCTURE WOUND WITH FOREIGN BODY, RIGHT FOOT, INITIAL ENCOUNTER: ICD-10-CM

## 2024-09-04 PROCEDURE — 1036F TOBACCO NON-USER: CPT | Performed by: INTERNAL MEDICINE

## 2024-09-04 PROCEDURE — 1090F PRES/ABSN URINE INCON ASSESS: CPT | Performed by: INTERNAL MEDICINE

## 2024-09-04 PROCEDURE — 99213 OFFICE O/P EST LOW 20 MIN: CPT | Performed by: INTERNAL MEDICINE

## 2024-09-04 PROCEDURE — G8420 CALC BMI NORM PARAMETERS: HCPCS | Performed by: INTERNAL MEDICINE

## 2024-09-04 PROCEDURE — 3017F COLORECTAL CA SCREEN DOC REV: CPT | Performed by: INTERNAL MEDICINE

## 2024-09-04 PROCEDURE — 1123F ACP DISCUSS/DSCN MKR DOCD: CPT | Performed by: INTERNAL MEDICINE

## 2024-09-04 PROCEDURE — G8400 PT W/DXA NO RESULTS DOC: HCPCS | Performed by: INTERNAL MEDICINE

## 2024-09-04 PROCEDURE — G8427 DOCREV CUR MEDS BY ELIG CLIN: HCPCS | Performed by: INTERNAL MEDICINE

## 2024-09-04 SDOH — ECONOMIC STABILITY: INCOME INSECURITY: HOW HARD IS IT FOR YOU TO PAY FOR THE VERY BASICS LIKE FOOD, HOUSING, MEDICAL CARE, AND HEATING?: NOT HARD AT ALL

## 2024-09-04 SDOH — ECONOMIC STABILITY: FOOD INSECURITY: WITHIN THE PAST 12 MONTHS, YOU WORRIED THAT YOUR FOOD WOULD RUN OUT BEFORE YOU GOT MONEY TO BUY MORE.: NEVER TRUE

## 2024-09-04 SDOH — ECONOMIC STABILITY: FOOD INSECURITY: WITHIN THE PAST 12 MONTHS, THE FOOD YOU BOUGHT JUST DIDN'T LAST AND YOU DIDN'T HAVE MONEY TO GET MORE.: NEVER TRUE

## 2024-09-04 ASSESSMENT — PATIENT HEALTH QUESTIONNAIRE - PHQ9
1. LITTLE INTEREST OR PLEASURE IN DOING THINGS: NOT AT ALL
5. POOR APPETITE OR OVEREATING: NOT AT ALL
7. TROUBLE CONCENTRATING ON THINGS, SUCH AS READING THE NEWSPAPER OR WATCHING TELEVISION: NOT AT ALL
5. POOR APPETITE OR OVEREATING: NOT AT ALL
SUM OF ALL RESPONSES TO PHQ QUESTIONS 1-9: 2
SUM OF ALL RESPONSES TO PHQ QUESTIONS 1-9: 2
SUM OF ALL RESPONSES TO PHQ9 QUESTIONS 1 & 2: 1
4. FEELING TIRED OR HAVING LITTLE ENERGY: SEVERAL DAYS
SUM OF ALL RESPONSES TO PHQ QUESTIONS 1-9: 2
10. IF YOU CHECKED OFF ANY PROBLEMS, HOW DIFFICULT HAVE THESE PROBLEMS MADE IT FOR YOU TO DO YOUR WORK, TAKE CARE OF THINGS AT HOME, OR GET ALONG WITH OTHER PEOPLE: NOT DIFFICULT AT ALL
2. FEELING DOWN, DEPRESSED OR HOPELESS: SEVERAL DAYS
SUM OF ALL RESPONSES TO PHQ QUESTIONS 1-9: 2
6. FEELING BAD ABOUT YOURSELF - OR THAT YOU ARE A FAILURE OR HAVE LET YOURSELF OR YOUR FAMILY DOWN: NOT AT ALL
3. TROUBLE FALLING OR STAYING ASLEEP: NOT AT ALL
9. THOUGHTS THAT YOU WOULD BE BETTER OFF DEAD, OR OF HURTING YOURSELF: NOT AT ALL
SUM OF ALL RESPONSES TO PHQ9 QUESTIONS 1 & 2: 1
7. TROUBLE CONCENTRATING ON THINGS, SUCH AS READING THE NEWSPAPER OR WATCHING TELEVISION: NOT AT ALL
3. TROUBLE FALLING OR STAYING ASLEEP: NOT AT ALL
SUM OF ALL RESPONSES TO PHQ QUESTIONS 1-9: 2
SUM OF ALL RESPONSES TO PHQ QUESTIONS 1-9: 2
6. FEELING BAD ABOUT YOURSELF - OR THAT YOU ARE A FAILURE OR HAVE LET YOURSELF OR YOUR FAMILY DOWN: NOT AT ALL
2. FEELING DOWN, DEPRESSED OR HOPELESS: SEVERAL DAYS
SUM OF ALL RESPONSES TO PHQ QUESTIONS 1-9: 2
8. MOVING OR SPEAKING SO SLOWLY THAT OTHER PEOPLE COULD HAVE NOTICED. OR THE OPPOSITE, BEING SO FIGETY OR RESTLESS THAT YOU HAVE BEEN MOVING AROUND A LOT MORE THAN USUAL: NOT AT ALL
10. IF YOU CHECKED OFF ANY PROBLEMS, HOW DIFFICULT HAVE THESE PROBLEMS MADE IT FOR YOU TO DO YOUR WORK, TAKE CARE OF THINGS AT HOME, OR GET ALONG WITH OTHER PEOPLE: NOT DIFFICULT AT ALL
4. FEELING TIRED OR HAVING LITTLE ENERGY: SEVERAL DAYS
1. LITTLE INTEREST OR PLEASURE IN DOING THINGS: NOT AT ALL
8. MOVING OR SPEAKING SO SLOWLY THAT OTHER PEOPLE COULD HAVE NOTICED. OR THE OPPOSITE, BEING SO FIGETY OR RESTLESS THAT YOU HAVE BEEN MOVING AROUND A LOT MORE THAN USUAL: NOT AT ALL
SUM OF ALL RESPONSES TO PHQ QUESTIONS 1-9: 2

## 2024-09-04 NOTE — PROGRESS NOTES
9/4/2024   Katie Barba  1960    The patients PMH, surgical history, family history, medications, allergies were all reviewed and updated as appropriate today.     Current Outpatient Medications on File Prior to Visit   Medication Sig Dispense Refill    aspirin 81 MG tablet Take 81 mg by mouth daily      magnesium oxide (MAG-OX) 400 MG tablet Take 200 mg by mouth daily      Cholecalciferol (VITAMIN D) 2000 units CAPS capsule Take 1 capsule by mouth daily      Multiple Vitamins-Minerals (THERAPEUTIC MULTIVITAMIN-MINERALS) tablet Take 1 tablet by mouth daily.      ascorbic acid (VITAMIN C) 500 MG tablet Take 500 mg by mouth daily.      Coenzyme Q10 (CO Q 10) 100 MG CAPS Take 1 tablet by mouth daily.       No current facility-administered medications on file prior to visit.        Chief Complaint   Patient presents with    Foot Injury     Left  foot       Urgent visit for toe injury  HPI:  c/o L 5th toe injury  wantsL foot/toe x-rays done    Review of [facility] about getting a foreign body removed from bottom of R foot, been there x 3 months    Wants vaccination recommendations as well today    OBJECTIVE:  /72 (Site: Left Upper Arm, Position: Sitting, Cuff Size: Medium Adult)   Pulse 77   Temp 98.4 °F (36.9 °C) (Infrared)   Resp 14   Ht 1.664 m (5' 5.5\")   Wt 57.2 kg (126 lb 3.2 oz)   SpO2 98%   BMI 20.68 kg/m²   Wt Readings from Last 3 Encounters:   09/04/24 57.2 kg (126 lb 3.2 oz)   03/02/23 61.7 kg (136 lb)   07/05/22 59.6 kg (131 lb 6.4 oz)       Physical Exam  Vitals and nursing note reviewed.   Constitutional:       Appearance: Normal appearance. She is well-developed and normal weight.      Comments: /72 (Site: Left Upper Arm, Position: Sitting, Cuff Size: Medium Adult)   Pulse 77   Temp 98.4 °F (36.9 °C) (Infrared)   Resp 14   Ht 1.664 m (5' 5.5\")   Wt 57.2 kg (126 lb 3.2 oz)   SpO2 98%   BMI 20.68 kg/m²      Neck:      Thyroid: No thyromegaly.      Vascular: No

## 2024-09-12 ENCOUNTER — TELEPHONE (OUTPATIENT)
Dept: PRIMARY CARE CLINIC | Age: 64
End: 2024-09-12

## 2024-09-13 ENCOUNTER — TELEPHONE (OUTPATIENT)
Dept: PRIMARY CARE CLINIC | Age: 64
End: 2024-09-13

## 2024-09-17 DIAGNOSIS — S92.919A CLOSED NONDISPLACED FRACTURE OF PHALANX OF TOE, UNSPECIFIED LATERALITY, UNSPECIFIED TOE, INITIAL ENCOUNTER: Primary | ICD-10-CM

## 2024-11-07 ENCOUNTER — TELEPHONE (OUTPATIENT)
Dept: PRIMARY CARE CLINIC | Age: 64
End: 2024-11-07

## 2024-11-07 DIAGNOSIS — S91.341A PUNCTURE WOUND WITH FOREIGN BODY, RIGHT FOOT, INITIAL ENCOUNTER: Primary | ICD-10-CM

## 2024-11-07 NOTE — TELEPHONE ENCOUNTER
Patient recently moved near [place] (pt's address is update) and requested for the podiatry referral to be sent out to a new location much closer to her.    She requested for podiatry referral to be sent out to [facility] in [place]       Podiatry  [address] [place] [address], [address]  [place] [address]  [phone]  [phone]

## 2025-05-21 ENCOUNTER — PATIENT MESSAGE (OUTPATIENT)
Dept: PRIMARY CARE CLINIC | Age: 65
End: 2025-05-21

## 2025-05-21 SDOH — HEALTH STABILITY: PHYSICAL HEALTH: ON AVERAGE, HOW MANY DAYS PER WEEK DO YOU ENGAGE IN MODERATE TO STRENUOUS EXERCISE (LIKE A BRISK WALK)?: 6 DAYS

## 2025-05-21 SDOH — HEALTH STABILITY: PHYSICAL HEALTH: ON AVERAGE, HOW MANY MINUTES DO YOU ENGAGE IN EXERCISE AT THIS LEVEL?: 40 MIN

## 2025-05-21 ASSESSMENT — PATIENT HEALTH QUESTIONNAIRE - PHQ9
SUM OF ALL RESPONSES TO PHQ QUESTIONS 1-9: 0
1. LITTLE INTEREST OR PLEASURE IN DOING THINGS: NOT AT ALL
SUM OF ALL RESPONSES TO PHQ QUESTIONS 1-9: 0
2. FEELING DOWN, DEPRESSED OR HOPELESS: NOT AT ALL

## 2025-05-21 ASSESSMENT — LIFESTYLE VARIABLES
HOW OFTEN DO YOU HAVE SIX OR MORE DRINKS ON ONE OCCASION: 1
HOW MANY STANDARD DRINKS CONTAINING ALCOHOL DO YOU HAVE ON A TYPICAL DAY: PATIENT DOES NOT DRINK
HOW MANY STANDARD DRINKS CONTAINING ALCOHOL DO YOU HAVE ON A TYPICAL DAY: 0
HOW OFTEN DO YOU HAVE A DRINK CONTAINING ALCOHOL: NEVER
HOW OFTEN DO YOU HAVE A DRINK CONTAINING ALCOHOL: 1